# Patient Record
Sex: FEMALE | Race: OTHER | URBAN - METROPOLITAN AREA
[De-identification: names, ages, dates, MRNs, and addresses within clinical notes are randomized per-mention and may not be internally consistent; named-entity substitution may affect disease eponyms.]

---

## 2022-12-15 ENCOUNTER — EMERGENCY (EMERGENCY)
Facility: HOSPITAL | Age: 57
LOS: 1 days | Discharge: HOME | End: 2022-12-15
Attending: STUDENT IN AN ORGANIZED HEALTH CARE EDUCATION/TRAINING PROGRAM | Admitting: STUDENT IN AN ORGANIZED HEALTH CARE EDUCATION/TRAINING PROGRAM
Payer: COMMERCIAL

## 2022-12-15 VITALS
DIASTOLIC BLOOD PRESSURE: 65 MMHG | RESPIRATION RATE: 18 BRPM | OXYGEN SATURATION: 98 % | SYSTOLIC BLOOD PRESSURE: 105 MMHG | HEART RATE: 92 BPM | TEMPERATURE: 98 F

## 2022-12-15 VITALS
HEART RATE: 98 BPM | DIASTOLIC BLOOD PRESSURE: 74 MMHG | RESPIRATION RATE: 18 BRPM | HEIGHT: 71 IN | OXYGEN SATURATION: 98 % | WEIGHT: 220.02 LBS | TEMPERATURE: 98 F | SYSTOLIC BLOOD PRESSURE: 120 MMHG

## 2022-12-15 DIAGNOSIS — Z88.0 ALLERGY STATUS TO PENICILLIN: ICD-10-CM

## 2022-12-15 DIAGNOSIS — Z95.810 PRESENCE OF AUTOMATIC (IMPLANTABLE) CARDIAC DEFIBRILLATOR: ICD-10-CM

## 2022-12-15 DIAGNOSIS — D86.9 SARCOIDOSIS, UNSPECIFIED: ICD-10-CM

## 2022-12-15 DIAGNOSIS — J10.1 INFLUENZA DUE TO OTHER IDENTIFIED INFLUENZA VIRUS WITH OTHER RESPIRATORY MANIFESTATIONS: ICD-10-CM

## 2022-12-15 DIAGNOSIS — J45.901 UNSPECIFIED ASTHMA WITH (ACUTE) EXACERBATION: ICD-10-CM

## 2022-12-15 DIAGNOSIS — Z20.822 CONTACT WITH AND (SUSPECTED) EXPOSURE TO COVID-19: ICD-10-CM

## 2022-12-15 DIAGNOSIS — R06.02 SHORTNESS OF BREATH: ICD-10-CM

## 2022-12-15 LAB
FLUAV AG NPH QL: DETECTED
FLUBV AG NPH QL: SIGNIFICANT CHANGE UP
RSV RNA NPH QL NAA+NON-PROBE: SIGNIFICANT CHANGE UP
SARS-COV-2 RNA SPEC QL NAA+PROBE: SIGNIFICANT CHANGE UP

## 2022-12-15 PROCEDURE — 71045 X-RAY EXAM CHEST 1 VIEW: CPT

## 2022-12-15 PROCEDURE — 99285 EMERGENCY DEPT VISIT HI MDM: CPT | Mod: 25

## 2022-12-15 PROCEDURE — 87637 SARSCOV2&INF A&B&RSV AMP PRB: CPT

## 2022-12-15 PROCEDURE — 94640 AIRWAY INHALATION TREATMENT: CPT

## 2022-12-15 PROCEDURE — 71045 X-RAY EXAM CHEST 1 VIEW: CPT | Mod: 26

## 2022-12-15 PROCEDURE — 99284 EMERGENCY DEPT VISIT MOD MDM: CPT

## 2022-12-15 RX ORDER — ALBUTEROL 90 UG/1
2 AEROSOL, METERED ORAL
Qty: 1 | Refills: 0
Start: 2022-12-15 | End: 2022-12-28

## 2022-12-15 RX ORDER — IPRATROPIUM/ALBUTEROL SULFATE 18-103MCG
3 AEROSOL WITH ADAPTER (GRAM) INHALATION
Refills: 0 | Status: COMPLETED | OUTPATIENT
Start: 2022-12-15 | End: 2022-12-15

## 2022-12-15 RX ADMIN — Medication 3 MILLILITER(S): at 18:37

## 2022-12-15 RX ADMIN — Medication 3 MILLILITER(S): at 18:08

## 2022-12-15 RX ADMIN — Medication 3 MILLILITER(S): at 17:23

## 2022-12-15 NOTE — ED PROVIDER NOTE - PROGRESS NOTE DETAILS
lungs cta b/l, pt reports feeling much better after being dc'd pt insisted on getting a cxr -- had a long discussion w/pt earlier about no benefit in getting radiation associated w/imaging for it will not alter tx plan, but ordered as requested cxr + aicd, no i/e

## 2022-12-15 NOTE — ED ADULT NURSE NOTE - PAIN RATING/NUMBER SCALE (0-10): ACTIVITY
Message  Received: Today  MD Della Bazzi  Caller: Unspecified (Yesterday,  3:43 PM)  Peer to peer completed, left knee MRI approved. Auth# [id number]. Good from 1/4-3/4/22. Thanks.     0

## 2022-12-15 NOTE — ED PROVIDER NOTE - CLINICAL SUMMARY MEDICAL DECISION MAKING FREE TEXT BOX
pt c/o cough, congestion and sob x 4 d. hx of sarcoid and ? asthma, pt well appearing, non toxic, afebrile, no resp distress/no hypoxia, + wheezing on exam - was given solumedrol by ems, given 3 dual nebs in ed -- symptomatically improved, suspect viral etiology -- viral swab sent, no concern for bacterial inf - no concern for pna, tested flu a positive - will rx tamiflu, stable for dc, symptom control and supportive care discussed

## 2022-12-15 NOTE — ED PROVIDER NOTE - CARE PLAN
1 Principal Discharge DX:	Acute asthma exacerbation   Principal Discharge DX:	Exacerbation of reactive airway disease   Principal Discharge DX:	Exacerbation of reactive airway disease  Secondary Diagnosis:	Influenza

## 2022-12-15 NOTE — ED PROVIDER NOTE - RESPIRATORY, MLM
+ expiratory wheezes b/l, no rales or rhonchi b/l, no accessory muscle use, speaking in long full sentences

## 2022-12-15 NOTE — ED ADULT NURSE NOTE - OBJECTIVE STATEMENT
Pt is 57 y.o female client walk in complaining of sob for 4 days denies cp, fever, chills, dizziness, headache, tingling, numbness, abd pain and urinary sx. EKG done. IV inserted. Assessment ongoing.

## 2022-12-15 NOTE — ED PROVIDER NOTE - OBJECTIVE STATEMENT
The pt is a 56 y/o F, BIBA c/o SOB x 4 d. Pt states cough and congestion, feeling sob - wheezing, using mdi w/min relief, was given solumedrol by ems en route. Last asthma attack while ago, never intubated, never hosp. Denies fevers, chills, cp, dyspnea, n/v/d, abd pain, dizziness, syncope. The pt is a 56 y/o F, BIBA c/o SOB x 4 d. Pt states cough and congestion, feeling sob - wheezing, was given solumedrol by ems en route. Hx of sarcoid, ? asthma. Denies fevers, chills, cp, dyspnea, n/v/d, abd pain, dizziness, syncope.

## 2022-12-15 NOTE — ED PROVIDER NOTE - PATIENT PORTAL LINK FT
You can access the FollowMyHealth Patient Portal offered by [facility] by registering at the following website: [email or web address]. By joining [employer]’s FollowMyHealth portal, you will also be able to view your health information using other applications (apps) compatible with our system.

## 2022-12-15 NOTE — ED PROVIDER NOTE - NSFOLLOWUPINSTRUCTIONS_ED_ALL_ED_FT
REST, KEEP YOURSELF WELL HYDRATED, USE HUMIDIFIER AT HOME, USE INHALER AND TAKE COUGH MEDICATION PLUS STEROIDS, FOLLOW UP WITH YOUR PMD, YOUR VIRAL SWAB WAS NOT BACK AT TIME OF DISCHARGE - YOU WILL BE CONTACTED IF YOU TEST POSITIVE, RETURN TO ED FOR ANY WORSENING OR CONCERNING SYMPTOMS.   Bronchospasm, Adult  Outline of a person's upper body showing the lungs, with close-ups of two airways, one normal and one tightened.   Bronchospasm is when the small airways in the lungs narrow. This can make it very hard to breathe. Swelling and more mucus than normal can add to this problem.  What are the causes?  •Having a cold.  •Exercise.  •The smell from sprays, perfumes, candles, and .  •Cold air.  •Stress or strong feelings such as laughing or crying.  What increases the risk?  •Having asthma.  •Smoking.  •Being around someone who smokes (secondhand smoke).  •Having allergies.  •Being allergic to certain foods, medicine, or bug bites or stings.  What are the signs or symptoms?  •Making a high-pitched whistling sound when you breathe, most often when you breathe out (wheezing).  •Coughing.  •A tight feeling in your chest.  •Feeling like you cannot catch your breath.  •Feeling like you have no energy to exercise.  •Breathing that is noisy.  •A cough that has a high pitch.  How is this treated?  Two respiratory inhalers.   •Using medicines that you breathe in (inhale). These open up the airways and help you breathe. Medicines can be taken with a metered dose inhaler or a nebulizer device.  •Taking medicines to reduce swelling.  •Getting rid of what started the bronchospasm.  Follow these instructions at home:  Medicines   •Take over-the-counter and prescription medicines only as told by your doctor.  •If you need to use an inhaler or nebulizer to take your medicine, ask your doctor how to use it.  •You may be given a spacer to use with your inhaler. This makes it easier to get the medicine from the inhaler into your lungs.  Lifestyle   • Do not smoke or use any products that contain nicotine or tobacco. If you need help quitting, ask your doctor.  •Keep track of things that start your bronchospasm. Avoid these if you can.  •When pollen, air pollution, or humidity is bad, keep windows closed. Use an air conditioner if you have one.  •Find ways to cope with stress and your feelings  Activity   Some people have bronchospasm when they exercise. This is called exercise-induced bronchoconstriction (EIB). If you have this problem, talk with your doctor about how to deal with EIB. Some tips include:  •Use your inhaler before exercise.  •Exercise indoors if it is very cold or humid, or if the pollen and mold counts are high.  •Warm up and cool down before and after exercise.  •Stop your exercise right away if your symptoms start or get worse.  General instructions  •If you have asthma, make sure you have an asthma action plan.  •Stay up to date on your shots (immunizations).  •Keep all follow-up visits.  Get help right away if:  •You have trouble breathing.  •You wheeze and cough and this does not get better after you take medicine.  •You have chest pain.  •You have trouble speaking more than one word in a sentence.  These symptoms may be an emergency. Get help right away. Call 911.   • Do not wait to see if the symptoms will go away.   • Do not drive yourself to the hospital.   Summary  •Bronchospasm is when the small airways in the lungs narrow. Swelling and more mucus than normal can add to this problem. This can make it very hard to breathe.  • Do not smoke or use any products that contain nicotine or tobacco. If you need help quitting, ask your doctor.  •Get help right away if you wheeze and cough and this does not get better after you take medicine. REST, KEEP YOURSELF WELL HYDRATED, USE HUMIDIFIER AT HOME, USE INHALER AND TAKE COUGH MEDICATION PLUS STEROIDS, FOLLOW UP WITH YOUR PMD, YOUR VIRAL SWAB WAS POSITIVE FOR FLU -- SYMPTOM CONTROL AND SUPPORTIVE CARE IS VERY IMPORTANT, RETURN TO ED FOR ANY WORSENING OR CONCERNING SYMPTOMS.   Bronchospasm, Adult  Outline of a person's upper body showing the lungs, with close-ups of two airways, one normal and one tightened.   Bronchospasm is when the small airways in the lungs narrow. This can make it very hard to breathe. Swelling and more mucus than normal can add to this problem.  What are the causes?  •Having a cold.  •Exercise.  •The smell from sprays, perfumes, candles, and .  •Cold air.  •Stress or strong feelings such as laughing or crying.  What increases the risk?  •Having asthma.  •Smoking.  •Being around someone who smokes (secondhand smoke).  •Having allergies.  •Being allergic to certain foods, medicine, or bug bites or stings.  What are the signs or symptoms?  •Making a high-pitched whistling sound when you breathe, most often when you breathe out (wheezing).  •Coughing.  •A tight feeling in your chest.  •Feeling like you cannot catch your breath.  •Feeling like you have no energy to exercise.  •Breathing that is noisy.  •A cough that has a high pitch.  How is this treated?  Two respiratory inhalers.   •Using medicines that you breathe in (inhale). These open up the airways and help you breathe. Medicines can be taken with a metered dose inhaler or a nebulizer device.  •Taking medicines to reduce swelling.  •Getting rid of what started the bronchospasm.  Follow these instructions at home:  Medicines   •Take over-the-counter and prescription medicines only as told by your doctor.  •If you need to use an inhaler or nebulizer to take your medicine, ask your doctor how to use it.  •You may be given a spacer to use with your inhaler. This makes it easier to get the medicine from the inhaler into your lungs.  Lifestyle   • Do not smoke or use any products that contain nicotine or tobacco. If you need help quitting, ask your doctor.  •Keep track of things that start your bronchospasm. Avoid these if you can.  •When pollen, air pollution, or humidity is bad, keep windows closed. Use an air conditioner if you have one.  •Find ways to cope with stress and your feelings  Activity   Some people have bronchospasm when they exercise. This is called exercise-induced bronchoconstriction (EIB). If you have this problem, talk with your doctor about how to deal with EIB. Some tips include:  •Use your inhaler before exercise.  •Exercise indoors if it is very cold or humid, or if the pollen and mold counts are high.  •Warm up and cool down before and after exercise.  •Stop your exercise right away if your symptoms start or get worse.  General instructions  •If you have asthma, make sure you have an asthma action plan.  •Stay up to date on your shots (immunizations).  •Keep all follow-up visits.  Get help right away if:  •You have trouble breathing.  •You wheeze and cough and this does not get better after you take medicine.  •You have chest pain.  •You have trouble speaking more than one word in a sentence.  These symptoms may be an emergency. Get help right away. Call 911.   • Do not wait to see if the symptoms will go away.   • Do not drive yourself to the hospital.   Summary  •Bronchospasm is when the small airways in the lungs narrow. Swelling and more mucus than normal can add to this problem. This can make it very hard to breathe.  • Do not smoke or use any products that contain nicotine or tobacco. If you need help quitting, ask your doctor.  •Get help right away if you wheeze and cough and this does not get better after you take medicine.

## 2025-04-14 NOTE — ED ADULT NURSE NOTE - NS ED NURSE LEVEL OF CONSCIOUSNESS SPEECH
Please follow-up with Dr. Donato.  Recommend return to the ED if you develop any worsening shoulder pain, neck or back pain, chest pain, shortness of breath, numbness, weakness or any other concerning symptoms.  Recommend Motrin, Tylenol, ice or heat.   Speaking Coherently
